# Patient Record
Sex: MALE | Race: WHITE | Employment: OTHER | URBAN - METROPOLITAN AREA
[De-identification: names, ages, dates, MRNs, and addresses within clinical notes are randomized per-mention and may not be internally consistent; named-entity substitution may affect disease eponyms.]

---

## 2018-03-23 ENCOUNTER — TELEPHONE (OUTPATIENT)
Dept: ORTHOPEDIC SURGERY | Age: 53
End: 2018-03-23

## 2018-03-23 DIAGNOSIS — M79.641 RIGHT HAND PAIN: Primary | ICD-10-CM

## 2018-03-27 ENCOUNTER — OFFICE VISIT (OUTPATIENT)
Dept: ORTHOPEDIC SURGERY | Age: 53
End: 2018-03-27
Payer: COMMERCIAL

## 2018-03-27 VITALS
BODY MASS INDEX: 35 KG/M2 | DIASTOLIC BLOOD PRESSURE: 81 MMHG | WEIGHT: 250 LBS | RESPIRATION RATE: 18 BRPM | HEIGHT: 71 IN | SYSTOLIC BLOOD PRESSURE: 126 MMHG | HEART RATE: 86 BPM

## 2018-03-27 DIAGNOSIS — G56.02 CARPAL TUNNEL SYNDROME OF LEFT WRIST: Primary | ICD-10-CM

## 2018-03-27 DIAGNOSIS — M19.031 LOCALIZED PRIMARY OSTEOARTHROSIS OF CARPOMETACARPAL JOINT OF RIGHT WRIST: ICD-10-CM

## 2018-03-27 DIAGNOSIS — M19.031 CMC DJD(CARPOMETACARPAL DEGENERATIVE JOINT DISEASE), LOCALIZED PRIMARY, RIGHT: ICD-10-CM

## 2018-03-27 DIAGNOSIS — G62.9 POLYNEUROPATHY: ICD-10-CM

## 2018-03-27 PROCEDURE — 99203 OFFICE O/P NEW LOW 30 MIN: CPT | Performed by: ORTHOPAEDIC SURGERY

## 2018-03-27 RX ORDER — FAMOTIDINE 40 MG/1
TABLET, FILM COATED ORAL
Refills: 1 | COMMUNITY
Start: 2018-03-21 | End: 2020-01-15

## 2018-03-27 RX ORDER — INSULIN ASPART 100 [IU]/ML
INJECTION, SOLUTION INTRAVENOUS; SUBCUTANEOUS
Refills: 5 | COMMUNITY
Start: 2018-03-14

## 2018-03-27 RX ORDER — INSULIN GLARGINE 100 [IU]/ML
INJECTION, SOLUTION SUBCUTANEOUS
Refills: 5 | COMMUNITY
Start: 2018-03-14 | End: 2020-01-15

## 2018-03-27 RX ORDER — HYDROCODONE BITARTRATE AND ACETAMINOPHEN 5; 325 MG/1; MG/1
TABLET ORAL
Refills: 0 | COMMUNITY
Start: 2018-03-17 | End: 2020-01-15

## 2018-03-27 RX ORDER — ATORVASTATIN CALCIUM 80 MG/1
TABLET, FILM COATED ORAL
Refills: 3 | COMMUNITY
Start: 2018-03-09 | End: 2020-01-15

## 2018-03-27 RX ORDER — CALCIUM CITRATE/VITAMIN D3 200MG-6.25
TABLET ORAL
Refills: 3 | COMMUNITY
Start: 2018-03-20

## 2018-03-27 RX ORDER — PEN NEEDLE, DIABETIC 32GX 5/32"
NEEDLE, DISPOSABLE MISCELLANEOUS
Refills: 3 | COMMUNITY
Start: 2018-03-20

## 2018-03-27 NOTE — PATIENT INSTRUCTIONS
times. Resisted rows    For this exercise, you will need elastic exercise material, such as surgical tubing or Thera-Band. 1. Put the band around a solid object at about waist level. (A bedpost will work well.) Each hand should hold an end of the band. 2. With your elbows at your sides and bent to 90 degrees, pull the band back. Your shoulder blades should move toward each other. Return to the starting position. 3. Repeat 8 to 12 times. External rotator strengthening exercise    1. Start by tying a piece of elastic exercise material to a doorknob. You can use surgical tubing or Thera-Band. (You may also hold one end of the band in each hand.)  2. Stand or sit with your shoulder relaxed and your elbow bent 90 degrees. Your upper arm should rest comfortably against your side. Squeeze a rolled towel between your elbow and your body for comfort. This will help keep your arm at your side. 3. Hold one end of the elastic band with the hand of the painful arm. 4. Start with your forearm across your belly. Slowly rotate the forearm out away from your body. Keep your elbow and upper arm tucked against the towel roll or the side of your body until you begin to feel tightness in your shoulder. Slowly move your arm back to where you started. 5. Repeat 8 to 12 times. Internal rotator strengthening exercise    1. Start by tying a piece of elastic exercise material to a doorknob. You can use surgical tubing or Thera-Band. 2. Stand or sit with your shoulder relaxed and your elbow bent 90 degrees. Your upper arm should rest comfortably against your side. Squeeze a rolled towel between your elbow and your body for comfort. This will help keep your arm at your side. 3. Hold one end of the elastic band in the hand of the painful arm. 4. Slowly rotate your forearm toward your body until it touches your belly. Slowly move it back to where you started.   5. Keep your elbow and upper arm firmly tucked against the towel roll or at pain.  Your doctor or physical therapist will tell you when you can start these exercises and which ones will work best for you. How to do the exercises  Pendulum swing    If you have pain in your back, do not do this exercise. 5. Hold on to a table or the back of a chair with your good arm. Then bend forward a little and let your sore arm hang straight down. This exercise does not use the arm muscles. Rather, use your legs and your hips to create movement that makes your arm swing freely. 6. Use the movement from your hips and legs to guide the slightly swinging arm back and forth like a pendulum (or elephant trunk). Then guide it in circles that start small (about the size of a dinner plate). Make the circles a bit larger each day, as your pain allows. 7. Do this exercise for 5 minutes, 5 to 7 times each day. 8. As you have less pain, try bending over a little farther to do this exercise. This will increase the amount of movement at your shoulder. Posterior stretching exercise    5. Hold the elbow of your injured arm with your other hand. 6. Use your hand to pull your injured arm gently up and across your body. You will feel a gentle stretch across the back of your injured shoulder. 7. Hold for at least 15 to 30 seconds. Then slowly lower your arm. 8. Repeat 2 to 4 times. Up-the-back stretch    Your doctor or physical therapist may want you to wait to do this stretch until you have regained most of your range of motion and strength. You can do this stretch in different ways. Hold any of these stretches for at least 15 to 30 seconds. Repeat them 2 to 4 times. 6. Put your hand in your back pocket. Let it rest there to stretch your shoulder. 7. With your other hand, hold your injured arm (palm outward) behind your back by the wrist. Pull your arm up gently to stretch your shoulder. 8. Next, put a towel over your other shoulder. Put the hand of your injured arm behind your back.  Now hold the back end of the towel. With the other hand, hold the front end of the towel in front of your body. Pull gently on the front end of the towel. This will bring your hand farther up your back to stretch your shoulder. Overhead stretch    4. Standing about an arm's length away, grasp onto a solid surface. You could use a countertop, a doorknob, or the back of a sturdy chair. 5. With your knees slightly bent, bend forward with your arms straight. Lower your upper body, and let your shoulders stretch. 6. As your shoulders are able to stretch farther, you may need to take a step or two backward. 7. Hold for at least 15 to 30 seconds. Then stand up and relax. If you had stepped back during your stretch, step forward so you can keep your hands on the solid surface. 8. Repeat 2 to 4 times. Shoulder flexion (lying down)    To make a wand for this exercise, use a piece of PVC pipe or a broom handle with the broom removed. Make the wand about a foot wider than your shoulders. 4. Lie on your back, holding a wand with both hands. Your palms should face down as you hold the wand. 5. Keeping your elbows straight, slowly raise your arms over your head. Raise them until you feel a stretch in your shoulders, upper back, and chest.  6. Hold for 15 to 30 seconds. 7. Repeat 2 to 4 times. Shoulder rotation (lying down)    To make a wand for this exercise, use a piece of PVC pipe or a broom handle with the broom removed. Make the wand about a foot wider than your shoulders. 6. Lie on your back. Hold a wand with both hands with your elbows bent and palms up. 7. Keep your elbows close to your body, and move the wand across your body toward the sore arm. 8. Hold for 8 to 12 seconds. 9. Repeat 2 to 4 times. Wall climbing (to the side)    Avoid any movement that is straight to your side, and be careful not to arch your back. Your arm should stay about 30 degrees to the front of your side.   7. Stand with your side to a wall so that your Keep your elbow and upper arm tucked against the towel roll or the side of your body until you begin to feel tightness in your shoulder. Slowly move your arm back to where you started. 5. Repeat 8 to 12 times. Follow-up care is a key part of your treatment and safety. Be sure to make and go to all appointments, and call your doctor if you are having problems. It's also a good idea to know your test results and keep a list of the medicines you take. Where can you learn more? Go to https://TIP Imagingpejeremiaheweb.Tactiga. org and sign in to your ROVOP account. Enter Wilmar Colon in the HyTrust box to learn more about \"Rotator Cuff: Exercises. \"     If you do not have an account, please click on the \"Sign Up Now\" link. Current as of: March 21, 2017  Content Version: 11.5  © 9354-2041 [employer]. Care instructions adapted under license by [facility] ([facility]). If you have questions about a medical condition or this instruction, always ask your healthcare professional. [address] any warranty or liability for your use of this information. Ganglions: Care Instructions  Your Care Instructions    A ganglion is a small sac, or cyst, filled with a clear fluid that is like jelly. A ganglion may look like a bump on the hand or wrist. It also can appear on your feet, ankles, knees, or shoulders. It is not cancer. A ganglion can grow out of the protective area, or capsule, around a joint. It also can grow on a tendon sheath, which covers the ropelike tendons that connect muscle to bone. A ganglion may hurt or cause numbness if it presses on a nerve. Many ganglions do not need treatment, and they often go away on their own. But if a ganglion hurts, becomes larger, causes numbness, or limits your activity, your doctor may want to drain it with a needle and syringe or remove it with minor surgery. Follow-up care is a key part of your treatment and safety.  Be sure to make and go to stiff and curl toward the palm. It is caused by thick tissue that grows under the skin in the palm of the hand. Sometimes the condition affects the palm but not the fingers. If the tissue gets thicker and affects one or more fingers, it may limit movement of your fingers and hand. Sometimes the condition can occur in the soles of the feet. The cause of Dupuytren's disease is not known. Dupuytren's disease may get worse slowly. If you have mild Dupuytren's disease, you may be able to keep your fingers moving with regular stretching. Surgery usually helps in severe cases. However, Dupuytren's disease can come back. Follow-up care is a key part of your treatment and safety. Be sure to make and go to all appointments, and call your doctor if you are having problems. It's also a good idea to know your test results and keep a list of the medicines you take. How can you care for yourself at home? · Follow your doctor's advice for physical or occupational therapy and exercises to put your fingers and hand through a range of motion. · Two times a day, massage your hand and gently stretch the fingers back. This can get rid of tightness and help keep your fingers flexible. · Try to avoid curling your hand tightly. For example, use utensils andtools that have larger hand . When should you call for help? Call your doctor now or seek immediate medical care if:  ? · You have numbness in your fingers. ? · You have a wound or sore on your finger or palm. ? · Your hand or fingers get worse. ? Watch closely for changes in your health, and be sure to contact your doctor if you have any problems. Where can you learn more? Go to https://Marl[employer] SoftwarechloeSurreal InkÂº.Prisync. org and sign in to your GoSpotCheck account. Enter T863 in the [employer] box to learn more about \"Dupuytren's Contracture: Care Instructions. \"     If you do not have an account, please click on the \"Sign Up Now\" link.   Current as of: March 21, 2017  Content Version: 11.5  © 0531-7131 [employer]. Care instructions adapted under license by [facility] ([facility]). If you have questions about a medical condition or this instruction, always ask your healthcare professional. [address] any warranty or liability for your use of this information.

## 2018-03-27 NOTE — PROGRESS NOTES
currently on gabapentin. He has not seen a neurologist at this time. Past Medical History:        Diagnosis Date    DM type 2 (diabetes mellitus, type 2) ([address]     Hyperlipidemia     Hypertension     Neuropathy (HCC)     B/L UPPER AND LOWER EXTREMITY    Skin abscess     Tobacco abuse      Past Surgical History:        Procedure Laterality Date    ELBOW SURGERY Left     Nerve release     FINGER SURGERY Left     INCISION AND DRAINAGE OF NECK ABSCESS  10/2016    KNEE SURGERY Left     Scope      Current Medications:   No current facility-administered medications for this visit. Allergies:  Patient has no known allergies. Social History:   TOBACCO:   reports that he has been smoking Cigarettes. He has been smoking about 2.00 packs per day. He has never used smokeless tobacco.  ETOH:   reports that he drinks about 4.2 oz of alcohol per week . DRUGS:   reports that he uses drugs, including Marijuana, about 7 times per week.   ACTIVITIES OF DAILY LIVING:    OCCUPATION:    Family History:       Problem Relation Age of Onset    Cancer Mother      skin    Diabetes Father     Hypertension Father     Stroke Father        REVIEW OF SYSTEMS:  CONSTITUTIONAL:  negative  EYES:  negative  HEENT:  negative  RESPIRATORY:  negative  CARDIOVASCULAR:  Hyperlipidemia, hypertension  GASTROINTESTINAL:  negative  GENITOURINARY:  negative  INTEGUMENT/BREAST:  negative  HEMATOLOGIC/LYMPHATIC:  negative  ALLERGIC/IMMUNOLOGIC:  negative  ENDOCRINE:  DM  MUSCULOSKELETAL:  pain  NEUROLOGICAL:  polyneuropathy  BEHAVIOR/PSYCH:  negative    PHYSICAL EXAM:    VITALS:  /81 (Site: Right Arm, Position: Sitting)   Pulse 86   Resp 18   Ht 5' 11\" (1.803 m)   Wt 250 lb (113.4 kg)   BMI 34.87 kg/m²   CONSTITUTIONAL:  awake, alert, cooperative, no apparent distress, and appears stated age  EYES:  Lids and lashes normal, pupils equal, round and reactive to light, extra ocular muscles intact, sclera clear, conjunctiva

## 2020-01-15 ENCOUNTER — HOSPITAL ENCOUNTER (OUTPATIENT)
Age: 55
Discharge: HOME | End: 2020-01-17
Payer: MEDICARE

## 2020-01-15 ENCOUNTER — OFFICE VISIT (OUTPATIENT)
Dept: PRIMARY CARE CLINIC | Age: 55
End: 2020-01-15
Payer: MEDICARE

## 2020-01-15 VITALS
RESPIRATION RATE: 16 BRPM | TEMPERATURE: 98.7 F | DIASTOLIC BLOOD PRESSURE: 82 MMHG | OXYGEN SATURATION: 94 % | WEIGHT: 283 LBS | HEART RATE: 86 BPM | SYSTOLIC BLOOD PRESSURE: 118 MMHG | BODY MASS INDEX: 39.62 KG/M2 | HEIGHT: 71 IN

## 2020-01-15 PROBLEM — E78.5 HYPERLIPIDEMIA: Status: ACTIVE | Noted: 2017-08-31

## 2020-01-15 PROBLEM — E11.9 TYPE 2 DIABETES MELLITUS (HCC): Status: ACTIVE | Noted: 2017-08-31

## 2020-01-15 LAB
ALBUMIN SERPL-MCNC: 4.2 G/DL (ref 3.5–5.2)
ALP BLD-CCNC: 88 U/L (ref 40–129)
ALT SERPL-CCNC: 16 U/L (ref 0–40)
ANION GAP SERPL CALCULATED.3IONS-SCNC: 13 MMOL/L (ref 7–16)
AST SERPL-CCNC: 17 U/L (ref 0–39)
BILIRUB SERPL-MCNC: 0.2 MG/DL (ref 0–1.2)
BUN BLDV-MCNC: 15 MG/DL (ref 6–20)
CALCIUM SERPL-MCNC: 9.5 MG/DL (ref 8.6–10.2)
CHLORIDE BLD-SCNC: 101 MMOL/L (ref 98–107)
CHOLESTEROL, TOTAL: 229 MG/DL (ref 0–199)
CO2: 25 MMOL/L (ref 22–29)
CREAT SERPL-MCNC: 1 MG/DL (ref 0.7–1.2)
CREATININE URINE POCT: 300
GFR AFRICAN AMERICAN: >60
GFR NON-AFRICAN AMERICAN: >60 ML/MIN/1.73
GLUCOSE BLD-MCNC: 139 MG/DL (ref 74–99)
HBA1C MFR BLD: 7.4 % (ref 4–5.6)
HDLC SERPL-MCNC: 32 MG/DL
LDL CHOLESTEROL CALCULATED: 159 MG/DL (ref 0–99)
MICROALBUMIN/CREAT 24H UR: 80 MG/G{CREAT}
MICROALBUMIN/CREAT UR-RTO: ABNORMAL
POTASSIUM SERPL-SCNC: 4.1 MMOL/L (ref 3.5–5)
SODIUM BLD-SCNC: 139 MMOL/L (ref 132–146)
TOTAL PROTEIN: 7.6 G/DL (ref 6.4–8.3)
TRIGL SERPL-MCNC: 192 MG/DL (ref 0–149)
VLDLC SERPL CALC-MCNC: 38 MG/DL

## 2020-01-15 PROCEDURE — 3017F COLORECTAL CA SCREEN DOC REV: CPT | Performed by: FAMILY MEDICINE

## 2020-01-15 PROCEDURE — G8417 CALC BMI ABV UP PARAM F/U: HCPCS | Performed by: FAMILY MEDICINE

## 2020-01-15 PROCEDURE — 2022F DILAT RTA XM EVC RTNOPTHY: CPT | Performed by: FAMILY MEDICINE

## 2020-01-15 PROCEDURE — 4004F PT TOBACCO SCREEN RCVD TLK: CPT | Performed by: FAMILY MEDICINE

## 2020-01-15 PROCEDURE — 36415 COLL VENOUS BLD VENIPUNCTURE: CPT

## 2020-01-15 PROCEDURE — G8427 DOCREV CUR MEDS BY ELIG CLIN: HCPCS | Performed by: FAMILY MEDICINE

## 2020-01-15 PROCEDURE — 80053 COMPREHEN METABOLIC PANEL: CPT

## 2020-01-15 PROCEDURE — 99214 OFFICE O/P EST MOD 30 MIN: CPT | Performed by: FAMILY MEDICINE

## 2020-01-15 PROCEDURE — 83036 HEMOGLOBIN GLYCOSYLATED A1C: CPT

## 2020-01-15 PROCEDURE — 3046F HEMOGLOBIN A1C LEVEL >9.0%: CPT | Performed by: FAMILY MEDICINE

## 2020-01-15 PROCEDURE — 80061 LIPID PANEL: CPT

## 2020-01-15 PROCEDURE — 82044 UR ALBUMIN SEMIQUANTITATIVE: CPT | Performed by: FAMILY MEDICINE

## 2020-01-15 PROCEDURE — G8484 FLU IMMUNIZE NO ADMIN: HCPCS | Performed by: FAMILY MEDICINE

## 2020-01-15 RX ORDER — PREGABALIN 100 MG/1
100 CAPSULE ORAL 3 TIMES DAILY
COMMUNITY

## 2020-01-15 RX ORDER — OMEPRAZOLE 40 MG/1
40 CAPSULE, DELAYED RELEASE ORAL DAILY
COMMUNITY

## 2020-01-15 ASSESSMENT — PATIENT HEALTH QUESTIONNAIRE - PHQ9
2. FEELING DOWN, DEPRESSED OR HOPELESS: 0
1. LITTLE INTEREST OR PLEASURE IN DOING THINGS: 0
SUM OF ALL RESPONSES TO PHQ9 QUESTIONS 1 & 2: 0
SUM OF ALL RESPONSES TO PHQ QUESTIONS 1-9: 0
SUM OF ALL RESPONSES TO PHQ QUESTIONS 1-9: 0

## 2020-01-15 NOTE — PROGRESS NOTES
1/15/2020     Shashi Jarrett    : 1965 Sex: male   Age: 47 y.o. Chief Complaint   Patient presents with    Established New Doctor    Diabetes       HPI: This 47y.o. -year-old male  presents today to establish as a new patient. Past medical history includes diabetes, hypertension, neuropathy and chronic lumbar pain. Social history is positive for alcohol and medical marijuana use. Family history is significant for diabetes, CVA and heart disease. Current medication list reviewed. The patient is tolerating all medications well without adverse events or known side effects. The patient does understand the risk and benefits of the prescribed medications. The patient is not up-to-date on all age-appropriate wellness issues. ROS:   Const: Denies changes in appetite, chills, fever, night sweats and weight loss. Eyes:  Denies discharge, a recent change in visual acuity, blurred vision and double vision. ENMT: Denies discharge of the ears, hearing loss, pain of the ears. Denies nasal or sinus symptoms other than stated above. Denies mouth or throat symptoms. CV:  Denies chest pain, dyspnea on exertion, orthopnea, palpitations and PND  Resp: Denies chest pain, cough, SOB and wheezing. GI: Denies abdominal pain, constipation, diarrhea, heartburn, indigestion, nausea and vomiting. : Denies dysuria, frequency, hematuria, nocturia and urgency. Musculo: Denies arthralgias and myalgia  Skin:  Denies lesions, pruritus and rash. Neuro: Denies dizziness, lightheadedness, numbness, tingling and weakness. Psych:  Denies anxiety and depression  Endocrine: Denies anxiety and depression. Hema/Lymph: Denies hematologic symptoms  Allergy/Immuno:  Denies allergic/immunologic symptoms.   Pertinent positives reviewed and noted      Current Outpatient Medications:     omeprazole (PRILOSEC) 40 MG delayed release capsule, Take 40 mg by mouth daily, Disp: , Rfl:     Insulin Degludec (TRESIBA FLEXTOUCH) 200

## 2020-01-16 ENCOUNTER — TELEPHONE (OUTPATIENT)
Dept: PRIMARY CARE CLINIC | Age: 55
End: 2020-01-16

## 2020-01-16 NOTE — TELEPHONE ENCOUNTER
Last Appointment:  1/15/2020  Future Appointments   Date Time Provider Leah Larkini   2/3/2020  8:45 AM Benja Kwan MD [facility]   4/15/2020  7:40 AM Salvador Bustillos MD [address] [place] [address]          Informed patient labs results. Scheduled for April.         Electronically signed by Bishop Raine LPN on 3/41/06 at 14:58 AM

## 2020-02-03 ENCOUNTER — OFFICE VISIT (OUTPATIENT)
Dept: PAIN MANAGEMENT | Age: 55
End: 2020-02-03
Payer: MEDICARE

## 2020-02-03 VITALS
BODY MASS INDEX: 39.62 KG/M2 | HEART RATE: 90 BPM | WEIGHT: 283 LBS | RESPIRATION RATE: 16 BRPM | SYSTOLIC BLOOD PRESSURE: 142 MMHG | HEIGHT: 71 IN | DIASTOLIC BLOOD PRESSURE: 78 MMHG | TEMPERATURE: 98.2 F | OXYGEN SATURATION: 95 %

## 2020-02-03 PROCEDURE — G8484 FLU IMMUNIZE NO ADMIN: HCPCS | Performed by: ANESTHESIOLOGY

## 2020-02-03 PROCEDURE — 4004F PT TOBACCO SCREEN RCVD TLK: CPT | Performed by: ANESTHESIOLOGY

## 2020-02-03 PROCEDURE — 2022F DILAT RTA XM EVC RTNOPTHY: CPT | Performed by: ANESTHESIOLOGY

## 2020-02-03 PROCEDURE — 99204 OFFICE O/P NEW MOD 45 MIN: CPT | Performed by: ANESTHESIOLOGY

## 2020-02-03 PROCEDURE — 3017F COLORECTAL CA SCREEN DOC REV: CPT | Performed by: ANESTHESIOLOGY

## 2020-02-03 PROCEDURE — G8417 CALC BMI ABV UP PARAM F/U: HCPCS | Performed by: ANESTHESIOLOGY

## 2020-02-03 PROCEDURE — G8427 DOCREV CUR MEDS BY ELIG CLIN: HCPCS | Performed by: ANESTHESIOLOGY

## 2020-02-03 PROCEDURE — 3051F HG A1C>EQUAL 7.0%<8.0%: CPT | Performed by: ANESTHESIOLOGY

## 2020-02-03 NOTE — PROGRESS NOTES
Patient:  BRENDA Celeste 1965  Date of Service:  2/3/20        Patient presents with complaints of hands/feet  pain that started 5 years ago and has been getting worse. He states the pain began following Diabetes/ pinched nerve left side     Pain is constant and is described as burning and numb. He rates the pain as a 8/10 on his worst day , 8/10 on his best day, and a 8/10 on average on the VAS scale. Pain does radiate to the upper extremities. He  has numbness of the the upper extremities. Alleviating factors include: nothing. Aggravating factors include:  movement, standing, sitting. He states that the pain does keep him from sleeping at night. He took his last dose of Lyrica      He is not on NSAIDS and  is not on anticoagulation medications to include none and is managed by     Previous treatments: Physical Therapy. Personal Expectations from this treatment: decrease pain     BP (!) 142/78   Pulse 90   Temp 98.2 °F (36.8 °C) (Oral)   Resp 16   Ht 5' 11\" (1.803 m)   Wt 283 lb (128.4 kg)   SpO2 95%   BMI 39.47 kg/m²     No LMP for male patient.

## 2020-03-26 ENCOUNTER — TELEPHONE (OUTPATIENT)
Dept: PRIMARY CARE CLINIC | Age: 55
End: 2020-03-26

## 2020-11-03 PROBLEM — E11.9 DM TYPE 2 (DIABETES MELLITUS, TYPE 2) (HCC): Status: RESOLVED | Noted: 2020-11-03 | Resolved: 2020-11-03
